# Patient Record
Sex: MALE | Employment: OTHER | URBAN - METROPOLITAN AREA
[De-identification: names, ages, dates, MRNs, and addresses within clinical notes are randomized per-mention and may not be internally consistent; named-entity substitution may affect disease eponyms.]

---

## 2017-01-18 ENCOUNTER — OFFICE VISIT (OUTPATIENT)
Dept: FAMILY MEDICINE CLINIC | Age: 72
End: 2017-01-18

## 2017-01-18 VITALS
WEIGHT: 234.25 LBS | DIASTOLIC BLOOD PRESSURE: 76 MMHG | TEMPERATURE: 97.4 F | OXYGEN SATURATION: 97 % | HEIGHT: 66 IN | HEART RATE: 65 BPM | SYSTOLIC BLOOD PRESSURE: 122 MMHG | RESPIRATION RATE: 16 BRPM | BODY MASS INDEX: 37.65 KG/M2

## 2017-01-18 DIAGNOSIS — E78.00 HYPERCHOLESTEROLEMIA: ICD-10-CM

## 2017-01-18 DIAGNOSIS — E03.1 CONGENITAL HYPOTHYROIDISM WITHOUT GOITER: ICD-10-CM

## 2017-01-18 DIAGNOSIS — E55.9 VITAMIN D DEFICIENCY: ICD-10-CM

## 2017-01-18 DIAGNOSIS — I10 ESSENTIAL HYPERTENSION: ICD-10-CM

## 2017-01-18 DIAGNOSIS — E11.9 TYPE 2 DIABETES MELLITUS WITHOUT COMPLICATION, WITHOUT LONG-TERM CURRENT USE OF INSULIN (HCC): Primary | ICD-10-CM

## 2017-01-18 NOTE — MR AVS SNAPSHOT
Visit Information Date & Time Provider Department Dept. Phone Encounter #  
 1/18/2017  1:45 PM [place] Acuna, [address] [facility] [phone] [id number] Follow-up Instructions Return for well exam mid April with fasting labs. Upcoming Health Maintenance Date Due  
 EYE EXAM RETINAL OR DILATED Q1 4/6/2016 FOOT EXAM Q1 7/14/2016 MICROALBUMIN Q1 3/28/2017 MEDICARE YEARLY EXAM 3/29/2017 GLAUCOMA SCREENING Q2Y 4/6/2017 HEMOGLOBIN A1C Q6M 4/11/2017 LIPID PANEL Q1 10/11/2017 COLONOSCOPY 4/27/2021 DTaP/Tdap/Td series (2 - Td) 7/14/2025 Allergies as of 1/18/2017  Review Complete On: 1/18/2017 By: Jacobo Guy LPN No Known Allergies Current Immunizations  Reviewed on 10/4/2016 Name Date Influenza High Dose Vaccine PF 10/1/2016 Influenza Vaccine 11/3/2015, 10/18/2013 Influenza Vaccine Split 10/5/2012, 1/4/2012 Pneumococcal Conjugate (PCV-13) 3/11/2016 Pneumococcal Vaccine (Unspecified Type) 1/4/2012 Tdap 7/14/2015 Not reviewed this visit You Were Diagnosed With   
  
 Codes Comments Type 2 diabetes mellitus without complication, without long-term current use of insulin (HCC)    -  Primary ICD-10-CM: E11.9 ICD-9-CM: 250.00 Vitamin D deficiency     ICD-10-CM: E55.9 ICD-9-CM: 268.9 Congenital hypothyroidism without goiter     ICD-10-CM: E03.1 ICD-9-CM: 921 Essential hypertension     ICD-10-CM: I10 
ICD-9-CM: 401.9 Hypercholesterolemia     ICD-10-CM: E78.00 ICD-9-CM: 272.0 Vitals BP Pulse Temp Resp Height(growth percentile) Weight(growth percentile) 122/76 65 97.4 °F (36.3 °C) (Oral) 16 5' 6\" (1.676 m) 234 lb 4 oz (106.3 kg) SpO2 BMI Smoking Status 97% 37.81 kg/m2 Never Smoker Vitals History BMI and BSA Data Body Mass Index Body Surface Area  
 37.81 kg/m 2 2.22 m 2 Preferred Pharmacy Pharmacy Name Phone [facility], [address] [place] [address] Your Updated Medication List  
  
   
This list is accurate as of: 1/18/17  1:55 PM.  Always use your most recent med list. amLODIPine 5 mg tablet Commonly known as:  Ned Dillardadrian Take 1 Tab by mouth daily. aspirin 325 mg tablet Commonly known as:  ASPIRIN Take 325 mg by mouth daily. atenolol 25 mg tablet Commonly known as:  TENORMIN  
TAKE THREE TABLETS BY MOUTH DAILY  
  
 atorvastatin 20 mg tablet Commonly known as:  LIPITOR  
TAKE ONE TABLET BY MOUTH ONCE DAILY FISH OIL 1,000 mg (120 mg-180 mg) Cap Generic drug:  Omega-3-DHA-EPA-Fish Oil Take  by mouth.  
  
 glimepiride 2 mg tablet Commonly known as:  AMARYL  
TAKE ONE TABLET BY MOUTH IN THE MORNING  
  
 glucose blood VI test strips strip Commonly known as:  ASCENSIA AUTODISC VI, ONE TOUCH ULTRA TEST VI  
by Does Not Apply route. Bid monitoring Dx: 250.00  
  
 hydroCHLOROthiazide 25 mg tablet Commonly known as:  HYDRODIURIL  
TAKE ONE TABLET BY MOUTH ONCE DAILY  
  
 levothyroxine 25 mcg tablet Commonly known as:  SYNTHROID Take 1 Tab by mouth Daily (before breakfast). lisinopril 40 mg tablet Commonly known as:  PRINIVIL, ZESTRIL  
TAKE ONE TABLET BY MOUTH DAILY losartan 100 mg tablet Commonly known as:  COZAAR  
TAKE ONE TABLET BY MOUTH DAILY  
  
 multivitamin tablet Commonly known as:  ONE A DAY Take 1 Tab by mouth daily. nicotinic acid 500 mg tablet Commonly known as:  NIACIN Take 500 mg by mouth Daily (before breakfast). SITagliptin-metFORMIN  mg per tablet Commonly known as:  JANUMET  
TAKE ONE TABLET BY MOUTH TWICE DAILY WITH MEALS  
  
 timolol 0.5 % ophthalmic solution Commonly known as:  TIMOPTIC We Performed the Following CBC WITH AUTOMATED DIFF [00913 CPT(R)] HEMOGLOBIN A1C WITH EAG [16525 CPT(R)] LIPID PANEL [50168 CPT(R)] METABOLIC PANEL, COMPREHENSIVE [54007 CPT(R)] MICROALBUMIN, UR, RAND W/ MICROALBUMIN/CREA RATIO [id number] CPT(R)] TSH 3RD GENERATION [83931 CPT(R)] VITAMIN D, 25 HYDROXY [id number] CPT(R)] Follow-up Instructions Return for well exam mid April with fasting labs. Introducing [facility]! Gerri Ansari introduces code-laboration patient portal. Now you can access parts of your medical record, email your doctor's office, and request medication refills online. 1. In your internet browser, go to [email or web address] 2. Click on the First Time User? Click Here link in the Sign In box. You will see the New Member Sign Up page. 3. Enter your code-[employer]aboration Access Code exactly as it appears below. You will not need to use this code after youve completed the sign-up process. If you do not sign up before the expiration date, you must request a new code. · code-laboration Access Code: [email or web address] Expires: 4/18/2017  1:54 PM 
 
4. Enter the last four digits of your Social Security Number (xxxx) and Date of Birth (mm/dd/yyyy) as indicated and click Submit. You will be taken to the next sign-up page. 5. Create a code-laboration ID. This will be your code-laboration login ID and cannot be changed, so think of one that is secure and easy to remember. 6. Create a code-laboration password. You can change your password at any time. 7. Enter your Password Reset Question and Answer. This can be used at a later time if you forget your password. 8. Enter your e-mail address. You will receive e-mail notification when new information is available in [address] 9. Click Sign Up. You can now view and download portions of your medical record. 10. Click the Download Summary menu link to download a portable copy of your medical information. If you have questions, please visit the Frequently Asked Questions section of the code-laboration website. Remember, code-laboration is NOT to be used for urgent needs. For medical emergencies, dial 911. Now available from your iPhone and Android! Please provide this summary of care documentation to your next provider. Your primary care clinician is listed as Carlos A Damico. If you have any questions after today's visit, please call [phone].

## 2017-01-18 NOTE — PROGRESS NOTES
1. Have you been to the ER, urgent care clinic since your last visit? Hospitalized since your last visit? No    2. Have you seen or consulted any other health care providers outside of the [address] [place] [address] since your last visit? Include any pap smears or colon screening.  No    Chief Complaint   Patient presents with    Follow-up     3 mo f/u, med ck

## 2017-01-19 LAB
25(OH)D3+25(OH)D2 SERPL-MCNC: 33.5 NG/ML (ref 30–100)
ALBUMIN SERPL-MCNC: 4.4 G/DL (ref 3.5–4.8)
ALBUMIN/CREAT UR: 17.2 MG/G CREAT (ref 0–30)
ALBUMIN/GLOB SERPL: 1.8 {RATIO} (ref 1.1–2.5)
ALP SERPL-CCNC: 45 IU/L (ref 39–117)
ALT SERPL-CCNC: 22 IU/L (ref 0–44)
AST SERPL-CCNC: 20 IU/L (ref 0–40)
BASOPHILS # BLD AUTO: 0 X10E3/UL (ref 0–0.2)
BASOPHILS NFR BLD AUTO: 0 %
BILIRUB SERPL-MCNC: 0.4 MG/DL (ref 0–1.2)
BUN SERPL-MCNC: 17 MG/DL (ref 8–27)
BUN/CREAT SERPL: 14 (ref 10–22)
CALCIUM SERPL-MCNC: 10.1 MG/DL (ref 8.6–10.2)
CHLORIDE SERPL-SCNC: 100 MMOL/L (ref 96–106)
CHOLEST SERPL-MCNC: 132 MG/DL (ref 100–199)
CO2 SERPL-SCNC: 25 MMOL/L (ref 18–29)
CREAT SERPL-MCNC: 1.25 MG/DL (ref 0.76–1.27)
CREAT UR-MCNC: 110.3 MG/DL
EOSINOPHIL # BLD AUTO: 0.1 X10E3/UL (ref 0–0.4)
EOSINOPHIL NFR BLD AUTO: 1 %
ERYTHROCYTE [DISTWIDTH] IN BLOOD BY AUTOMATED COUNT: 15.5 % (ref 12.3–15.4)
EST. AVERAGE GLUCOSE BLD GHB EST-MCNC: 166 MG/DL
GLOBULIN SER CALC-MCNC: 2.5 G/DL (ref 1.5–4.5)
GLUCOSE SERPL-MCNC: 124 MG/DL (ref 65–99)
HBA1C MFR BLD: 7.4 % (ref 4.8–5.6)
HCT VFR BLD AUTO: 42.7 % (ref 37.5–51)
HDLC SERPL-MCNC: 23 MG/DL
HGB BLD-MCNC: 14.4 G/DL (ref 12.6–17.7)
IMM GRANULOCYTES # BLD: 0 X10E3/UL (ref 0–0.1)
IMM GRANULOCYTES NFR BLD: 0 %
INTERPRETATION, 910389: NORMAL
INTERPRETATION: NORMAL
LDLC SERPL CALC-MCNC: 78 MG/DL (ref 0–99)
LYMPHOCYTES # BLD AUTO: 1.3 X10E3/UL (ref 0.7–3.1)
LYMPHOCYTES NFR BLD AUTO: 30 %
MCH RBC QN AUTO: 28.9 PG (ref 26.6–33)
MCHC RBC AUTO-ENTMCNC: 33.7 G/DL (ref 31.5–35.7)
MCV RBC AUTO: 86 FL (ref 79–97)
MICROALBUMIN UR-MCNC: 19 UG/ML
MONOCYTES # BLD AUTO: 0.5 X10E3/UL (ref 0.1–0.9)
MONOCYTES NFR BLD AUTO: 11 %
NEUTROPHILS # BLD AUTO: 2.6 X10E3/UL (ref 1.4–7)
NEUTROPHILS NFR BLD AUTO: 58 %
PLATELET # BLD AUTO: 181 X10E3/UL (ref 150–379)
POTASSIUM SERPL-SCNC: 5 MMOL/L (ref 3.5–5.2)
PROT SERPL-MCNC: 6.9 G/DL (ref 6–8.5)
RBC # BLD AUTO: 4.99 X10E6/UL (ref 4.14–5.8)
SODIUM SERPL-SCNC: 141 MMOL/L (ref 134–144)
TRIGL SERPL-MCNC: 155 MG/DL (ref 0–149)
TSH SERPL DL<=0.005 MIU/L-ACNC: 0.01 UIU/ML (ref 0.45–4.5)
VLDLC SERPL CALC-MCNC: 31 MG/DL (ref 5–40)
WBC # BLD AUTO: 4.5 X10E3/UL (ref 3.4–10.8)

## 2017-01-23 NOTE — PROGRESS NOTES
Find out if he is still taking synthroid because he is still running too fast.  He needs to stop the med completely if he is still on it and recheck his level in 3 months. Otherwise his labs look great for sugar and cholesterol.  Michael [place]

## 2017-01-26 NOTE — PROGRESS NOTES
HISTORY OF PRESENT ILLNESS  Abbie Sears is a 66 y.o. male. HPI  Cardiovascular Review:  The patient has hypertension and hyperlipidemia. Diet and Lifestyle: generally follows a low fat low cholesterol diet, generally follows a low sodium diet, exercises sporadically, nonsmoker  Home BP Monitoring: is not measured at home. Pertinent ROS: taking medications as instructed, no medication side effects noted, no TIA's, no chest pain on exertion, no dyspnea on exertion, no swelling of ankles. Diabetes Mellitus:  He has diabetes mellitus, and  hypertension and hyperlipidemia. Diabetic ROS - medication compliance: compliant all of the time, diabetic diet compliance: compliant most of the time, home glucose monitoring: fasting values range 120 or less. Lab review: orders written for new lab studies as appropriate; see orders. Thyroid Review:  Patient is seen for followup of hypothyroidism. Thyroid ROS: denies fatigue, weight changes, heat/cold intolerance, bowel/skin changes or CVS symptoms.   Vit D def:  Is not currently on meds, but low in the past, having some fatigue, no body aches    Patient Active Problem List    Diagnosis Date Noted    Vitamin D deficiency 07/14/2015    Hypothyroid 07/18/2014    [facility], [facility] 93/19/0861    Proteinuria 03/29/2011    Glaucoma 03/29/2011    CAD (coronary artery disease) 03/29/2011    HTN (hypertension) 03/29/2011    Hypertriglyceridemia 03/29/2011    Hypercholesterolemia 03/29/2011     Current Outpatient Prescriptions   Medication Sig Dispense Refill    sitaGLIPtin-metFORMIN (JANUMET)  mg per tablet TAKE ONE TABLET BY MOUTH TWICE DAILY WITH MEALS 180 Tab 3    lisinopril (PRINIVIL, ZESTRIL) 40 mg tablet TAKE ONE TABLET BY MOUTH DAILY 90 Tab 3    atorvastatin (LIPITOR) 20 mg tablet TAKE ONE TABLET BY MOUTH ONCE DAILY 90 Tab 3    hydrochlorothiazide (HYDRODIURIL) 25 mg tablet TAKE ONE TABLET BY MOUTH ONCE DAILY 90 Tab 3    losartan (COZAAR) 100 mg tablet TAKE ONE TABLET BY MOUTH DAILY 90 Tab 3    atenolol (TENORMIN) 25 mg tablet TAKE THREE TABLETS BY MOUTH DAILY 270 Tab 3    glimepiride (AMARYL) 2 mg tablet TAKE ONE TABLET BY MOUTH IN THE MORNING 90 Tab 3    amLODIPine (NORVASC) 5 mg tablet Take 1 Tab by mouth daily. 90 Tab 3    nicotinic acid (NIACIN) 500 mg tablet Take 500 mg by mouth Daily (before breakfast).  glucose blood VI test strips (ASCENSIA AUTODISC VI, ONE TOUCH ULTRA TEST VI) strip by Does Not Apply route. Bid monitoring  Dx: 250.00 300 Package 3    multivitamin (ONE A DAY) tablet Take 1 Tab by mouth daily.  timolol (TIMOPTIC) 0.5 % ophthalmic solution       Omega-3-DHA-EPA-Fish Oil (FISH OIL) 1,000 (120-180) mg Cap Take  by mouth.  aspirin (ASPIRIN) 325 mg tablet Take 325 mg by mouth daily.  levothyroxine (SYNTHROID) 25 mcg tablet Take 1 Tab by mouth Daily (before breakfast). 80 Tab 3     Family History   Problem Relation Age of Onset    Cancer Mother     Heart Disease Father     Cancer Sister     Alcohol abuse Brother      Social History   Substance Use Topics    Smoking status: Never Smoker    Smokeless tobacco: Never Used    Alcohol use No           ROS  A comprehensive review of system was obtained and negative except findings in the HPI    Visit Vitals    /76    Pulse 65    Temp 97.4 °F (36.3 °C) (Oral)    Resp 16    Ht 5' 6\" (1.676 m)    Wt 234 lb 4 oz (106.3 kg)    SpO2 97%    BMI 37.81 kg/m2     Physical Exam   Constitutional: He is oriented to person, place, and time. He appears well-developed and well-nourished. No distress. Cardiovascular: Normal rate and regular rhythm. No murmur heard. Pulmonary/Chest: Breath sounds normal. No respiratory distress. He has no wheezes. Musculoskeletal: He exhibits no edema. Neurological: He is alert and oriented to person, place, and time. Nursing note and vitals reviewed.       ASSESSMENT and PLAN  Encounter Diagnoses Name Primary?  Type 2 diabetes mellitus without complication, without long-term current use of insulin (HCC) Yes    Vitamin D deficiency     Congenital hypothyroidism without goiter     Essential hypertension     Hypercholesterolemia      Orders Placed This Encounter    CBC WITH AUTOMATED DIFF    LIPID PANEL    METABOLIC PANEL, COMPREHENSIVE    TSH 3RD GENERATION    HEMOGLOBIN A1C WITH EAG    VITAMIN D, 25 HYDROXY    MICROALBUMIN, UR, RAND W/ MICROALBUMIN/CREA RATIO    CVD REPORT    CKD REPORT     All labs updated today  Dev plan with results  Recheck 3 mo fasting  I have discussed the diagnosis with the patient and the intended plan as seen in the above orders. The patient has received an after-visit summary and questions were answered concerning future plans. Patient conveyed understanding of the plan at the time of the visit.     Sulma Bruno, MSN, ANP  1/26/2017

## 2017-04-18 ENCOUNTER — OFFICE VISIT (OUTPATIENT)
Dept: FAMILY MEDICINE CLINIC | Age: 72
End: 2017-04-18

## 2017-04-18 VITALS
HEART RATE: 60 BPM | BODY MASS INDEX: 36.84 KG/M2 | HEIGHT: 66 IN | TEMPERATURE: 97.8 F | OXYGEN SATURATION: 99 % | SYSTOLIC BLOOD PRESSURE: 108 MMHG | WEIGHT: 229.25 LBS | DIASTOLIC BLOOD PRESSURE: 74 MMHG

## 2017-04-18 DIAGNOSIS — E78.00 HYPERCHOLESTEROLEMIA: ICD-10-CM

## 2017-04-18 DIAGNOSIS — E03.1 CONGENITAL HYPOTHYROIDISM WITHOUT GOITER: ICD-10-CM

## 2017-04-18 DIAGNOSIS — I10 ESSENTIAL HYPERTENSION: ICD-10-CM

## 2017-04-18 DIAGNOSIS — E55.9 VITAMIN D DEFICIENCY: ICD-10-CM

## 2017-04-18 DIAGNOSIS — Z00.00 WELL ADULT EXAM: Primary | ICD-10-CM

## 2017-04-18 DIAGNOSIS — E11.9 TYPE 2 DIABETES MELLITUS WITHOUT COMPLICATION, WITHOUT LONG-TERM CURRENT USE OF INSULIN (HCC): ICD-10-CM

## 2017-04-18 RX ORDER — ATORVASTATIN CALCIUM 20 MG/1
TABLET, FILM COATED ORAL
Qty: 90 TAB | Refills: 3 | Status: SHIPPED | OUTPATIENT
Start: 2017-04-18

## 2017-04-18 RX ORDER — AMLODIPINE BESYLATE 5 MG/1
5 TABLET ORAL DAILY
Qty: 90 TAB | Refills: 3 | Status: SHIPPED | OUTPATIENT
Start: 2017-04-18

## 2017-04-18 RX ORDER — LISINOPRIL 40 MG/1
TABLET ORAL
Qty: 90 TAB | Refills: 3 | Status: SHIPPED | OUTPATIENT
Start: 2017-04-18

## 2017-04-18 RX ORDER — GLIMEPIRIDE 2 MG/1
TABLET ORAL
Qty: 90 TAB | Refills: 3 | Status: SHIPPED | OUTPATIENT
Start: 2017-04-18

## 2017-04-18 RX ORDER — HYDROCHLOROTHIAZIDE 25 MG/1
TABLET ORAL
Qty: 90 TAB | Refills: 3 | Status: SHIPPED | OUTPATIENT
Start: 2017-04-18

## 2017-04-18 RX ORDER — LOSARTAN POTASSIUM 100 MG/1
TABLET ORAL
Qty: 90 TAB | Refills: 3 | Status: SHIPPED | OUTPATIENT
Start: 2017-04-18

## 2017-04-18 RX ORDER — ATENOLOL 25 MG/1
TABLET ORAL
Qty: 270 TAB | Refills: 3 | Status: SHIPPED | OUTPATIENT
Start: 2017-04-18

## 2017-04-18 NOTE — PROGRESS NOTES
This is a Subsequent Medicare Annual Wellness Visit providing Personalized Prevention Plan Services (PPPS) (Performed 12 months after initial AWV and PPPS )  I have reviewed the patient's medical history in detail and updated the computerized patient record. History     Past Medical History:   Diagnosis Date    CAD (coronary artery disease)     Diabetes ([address]     Hypercholesterolemia     Hypertension       Past Surgical History:   Procedure Laterality Date    HX CORONARY STENT PLACEMENT  2008     Current Outpatient Prescriptions   Medication Sig Dispense Refill    SITagliptin-metFORMIN (JANUMET)  mg per tablet TAKE ONE TABLET BY MOUTH TWICE DAILY WITH MEALS 180 Tab 3    lisinopril (PRINIVIL, ZESTRIL) 40 mg tablet TAKE ONE TABLET BY MOUTH DAILY 90 Tab 3    atorvastatin (LIPITOR) 20 mg tablet TAKE ONE TABLET BY MOUTH ONCE DAILY 90 Tab 3    hydroCHLOROthiazide (HYDRODIURIL) 25 mg tablet TAKE ONE TABLET BY MOUTH ONCE DAILY 90 Tab 3    losartan (COZAAR) 100 mg tablet TAKE ONE TABLET BY MOUTH DAILY 90 Tab 3    atenolol (TENORMIN) 25 mg tablet TAKE THREE TABLETS BY MOUTH DAILY 270 Tab 3    glimepiride (AMARYL) 2 mg tablet TAKE ONE TABLET BY MOUTH IN THE MORNING 90 Tab 3    amLODIPine (NORVASC) 5 mg tablet Take 1 Tab by mouth daily. 90 Tab 3    nicotinic acid (NIACIN) 500 mg tablet Take 500 mg by mouth Daily (before breakfast).  glucose blood VI test strips (ASCENSIA AUTODISC VI, ONE TOUCH ULTRA TEST VI) strip by Does Not Apply route. Bid monitoring  Dx: 250.00 300 Package 3    multivitamin (ONE A DAY) tablet Take 1 Tab by mouth daily.  timolol (TIMOPTIC) 0.5 % ophthalmic solution       Omega-3-DHA-EPA-Fish Oil (FISH OIL) 1,000 (120-180) mg Cap Take  by mouth.  aspirin (ASPIRIN) 325 mg tablet Take 325 mg by mouth daily.        No Known Allergies  Family History   Problem Relation Age of Onset    Cancer Mother     Heart Disease Father     Cancer Sister     Alcohol abuse Brother Social History   Substance Use Topics    Smoking status: Never Smoker    Smokeless tobacco: Never Used    Alcohol use No     Patient Active Problem List   Diagnosis Code    Proteinuria R80.9    Glaucoma H40.9    CAD (coronary artery disease) I25.10    HTN (hypertension) I10    Hypertriglyceridemia E78.1    Hypercholesterolemia E78.00    Diabetes mellitus type 2, uncomplicated ([facility] W40.8    Hypothyroid E03.9    Vitamin D deficiency E55.9       Depression Risk Factor Screening:     PHQ 2 / 9, over the last two weeks 4/18/2017   Little interest or pleasure in doing things Not at all   Feeling down, depressed or hopeless Not at all   Total Score PHQ 2 0     Alcohol Risk Factor Screening: On any occasion during the past 3 months, have you had more than 4 drinks containing alcohol? No    Do you average more than 14 drinks per week? No      Functional Ability and Level of Safety:     Hearing Loss   mild    Activities of Daily Living   Self-care. Requires assistance with: no ADLs    Fall Risk     Fall Risk Assessment, last 12 mths 4/18/2017   Able to walk? Yes   Fall in past 12 months? No   Fall with injury? -   Number of falls in past 12 months -   Fall Risk Score -     Abuse Screen   Patient is not abused    Review of Systems   A comprehensive review of systems was negative except for that written in the HPI.     Physical Examination     Evaluation of Cognitive Function:  Mood/affect:  happy  Appearance: age appropriate  Family member/caregiver input: none    Visit Vitals    /74    Pulse 60    Temp 97.8 °F (36.6 °C)    Ht 5' 6\" (1.676 m)    Wt 229 lb 4 oz (104 kg)    SpO2 99%    BMI 37 kg/m2     General appearance: alert, cooperative, no distress, appears stated age  Lungs: clear to auscultation bilaterally  Heart: regular rate and rhythm, S1, S2 normal, no murmur, click, rub or gallop  Extremities: extremities normal, atraumatic, no cyanosis or edema    Patient Care Team:  Janis Cali Suri Mann NP as PCP - General (Family Practice)    Advice/Referrals/Counseling   Education and counseling provided:  Are appropriate based on today's review and evaluation      Assessment/Plan     Encounter Diagnoses   Name Primary?  Well adult exam Yes    Type 2 diabetes mellitus without complication, without long-term current use of insulin (HCC)     Essential hypertension     Hypercholesterolemia     Congenital hypothyroidism without goiter     Vitamin D deficiency      Orders Placed This Encounter    CBC WITH AUTOMATED DIFF    HEMOGLOBIN A1C WITH EAG    LIPID PANEL    METABOLIC PANEL, BASIC    TSH 3RD GENERATION    VITAMIN D, 25 HYDROXY    MICROALBUMIN, UR, RAND W/ MICROALBUMIN/CREA RATIO    SITagliptin-metFORMIN (JANUMET)  mg per tablet    lisinopril (PRINIVIL, ZESTRIL) 40 mg tablet    atorvastatin (LIPITOR) 20 mg tablet    hydroCHLOROthiazide (HYDRODIURIL) 25 mg tablet    losartan (COZAAR) 100 mg tablet    atenolol (TENORMIN) 25 mg tablet    glimepiride (AMARYL) 2 mg tablet    amLODIPine (NORVASC) 5 mg tablet   . Labs and refills updated today  Follow up 3 mo pending results  I have discussed the diagnosis with the patient and the intended plan as seen in the above orders. The patient has received an after-visit summary and questions were answered concerning future plans. Patient conveyed understanding of the plan at the time of the visit.     Dorothy Bhandari, MSN, ANP  4/18/2017

## 2017-04-18 NOTE — PROGRESS NOTES
1. Have you been to the ER, urgent care clinic since your last visit? Hospitalized since your last visit? No    2. Have you seen or consulted any other health care providers outside of the [address] [place] [address] since your last visit? Include any pap smears or colon screening.  No    Chief Complaint   Patient presents with    Complete Physical     wellness   [facility]

## 2017-04-18 NOTE — MR AVS SNAPSHOT
Visit Information Date & Time Provider Department Dept. Phone Encounter #  
 4/18/2017  9:30 AM Lseter Orantes, [place] [address] [facility] [phone] [id number] Upcoming Health Maintenance Date Due  
 EYE EXAM RETINAL OR DILATED Q1 4/6/2016 MEDICARE YEARLY EXAM 3/29/2017 GLAUCOMA SCREENING Q2Y 4/6/2017 HEMOGLOBIN A1C Q6M 7/18/2017 MICROALBUMIN Q1 1/18/2018 LIPID PANEL Q1 1/18/2018 FOOT EXAM Q1 4/18/2018 COLONOSCOPY 4/27/2021 DTaP/Tdap/Td series (2 - Td) 7/14/2025 Allergies as of 4/18/2017  Review Complete On: 4/18/2017 By: Jayda Ta LPN No Known Allergies Current Immunizations  Reviewed on 10/4/2016 Name Date Influenza High Dose Vaccine PF 10/1/2016 Influenza Vaccine 11/3/2015, 10/18/2013 Influenza Vaccine Split 10/5/2012, 1/4/2012 Pneumococcal Conjugate (PCV-13) 3/11/2016 Pneumococcal Vaccine (Unspecified Type) 1/4/2012 Tdap 7/14/2015 Not reviewed this visit You Were Diagnosed With   
  
 Codes Comments Well adult exam    -  Primary ICD-10-CM: Z00.00 ICD-9-CM: V70.0 Type 2 diabetes mellitus without complication, without long-term current use of insulin (HCC)     ICD-10-CM: E11.9 ICD-9-CM: 250.00 Essential hypertension     ICD-10-CM: I10 
ICD-9-CM: 401.9 Hypercholesterolemia     ICD-10-CM: E78.00 ICD-9-CM: 272.0 Congenital hypothyroidism without goiter     ICD-10-CM: E03.1 ICD-9-CM: 114 Vitamin D deficiency     ICD-10-CM: E55.9 ICD-9-CM: 268.9 Vitals BP Pulse Temp Height(growth percentile) Weight(growth percentile) SpO2  
 108/74 60 97.8 °F (36.6 °C) 5' 6\" (1.676 m) 229 lb 4 oz (104 kg) 99% BMI Smoking Status 37 kg/m2 Never Smoker Vitals History BMI and BSA Data Body Mass Index Body Surface Area  
 37 kg/m 2 2.2 m 2 Preferred Pharmacy Pharmacy Name Phone  [facility] [address] [phone] Your Updated Medication List  
  
   
This list is accurate as of: 4/18/17 10:09 AM.  Always use your most recent med list. amLODIPine 5 mg tablet Commonly known as:  Mosetta Hark Take 1 Tab by mouth daily. aspirin 325 mg tablet Commonly known as:  ASPIRIN Take 325 mg by mouth daily. atenolol 25 mg tablet Commonly known as:  TENORMIN  
TAKE THREE TABLETS BY MOUTH DAILY  
  
 atorvastatin 20 mg tablet Commonly known as:  LIPITOR  
TAKE ONE TABLET BY MOUTH ONCE DAILY FISH OIL 1,000 mg (120 mg-180 mg) Cap Generic drug:  Omega-3-DHA-EPA-Fish Oil Take  by mouth.  
  
 glimepiride 2 mg tablet Commonly known as:  AMARYL  
TAKE ONE TABLET BY MOUTH IN THE MORNING  
  
 glucose blood VI test strips strip Commonly known as:  ASCENSIA AUTODISC VI, ONE TOUCH ULTRA TEST VI  
by Does Not Apply route. Bid monitoring Dx: 250.00  
  
 hydroCHLOROthiazide 25 mg tablet Commonly known as:  HYDRODIURIL  
TAKE ONE TABLET BY MOUTH ONCE DAILY  
  
 lisinopril 40 mg tablet Commonly known as:  PRINIVIL, ZESTRIL  
TAKE ONE TABLET BY MOUTH DAILY losartan 100 mg tablet Commonly known as:  COZAAR  
TAKE ONE TABLET BY MOUTH DAILY  
  
 multivitamin tablet Commonly known as:  ONE A DAY Take 1 Tab by mouth daily. nicotinic acid 500 mg tablet Commonly known as:  NIACIN Take 500 mg by mouth Daily (before breakfast). SITagliptin-metFORMIN  mg per tablet Commonly known as:  JANUMET  
TAKE ONE TABLET BY MOUTH TWICE DAILY WITH MEALS  
  
 timolol 0.5 % ophthalmic solution Commonly known as:  TIMOPTIC Prescriptions Sent to Pharmacy Refills SITagliptin-metFORMIN (JANUMET)  mg per tablet 3 Sig: TAKE ONE TABLET BY MOUTH TWICE DAILY WITH MEALS Class: Normal  
 [facility] [address] [place] [address] Ph #: [phone] lisinopril (PRINIVIL, ZESTRIL) 40 mg tablet 3 Sig: TAKE ONE TABLET BY MOUTH DAILY Class: Normal  
 Pharmacy: [address] Ph #: [phone]  
 atorvastatin (LIPITOR) 20 mg tablet 3 Sig: TAKE ONE TABLET BY MOUTH ONCE DAILY Class: Normal  
 Pharmacy: [facility] [address] [place] [address] Ph #: [phone]  
 hydroCHLOROthiazide (HYDRODIURIL) 25 mg tablet 3 Sig: TAKE ONE TABLET BY MOUTH ONCE DAILY Class: Normal  
 Pharmacy: [facility] [address] [place] [address] Ph #: [phone]  
 losartan (COZAAR) 100 mg tablet 3 Sig: TAKE ONE TABLET BY MOUTH DAILY Class: Normal  
 Pharmacy: [address] Ph #: [phone]  
 atenolol (TENORMIN) 25 mg tablet 3 Sig: TAKE THREE TABLETS BY MOUTH DAILY Class: Normal  
 Pharmacy: [address] Ph #: [phone]  
 glimepiride (AMARYL) 2 mg tablet 3 Sig: TAKE ONE TABLET BY MOUTH IN THE MORNING Class: Normal  
 Pharmacy: [address] Ph #: [phone]  
 amLODIPine (NORVASC) 5 mg tablet 3 Sig: Take 1 Tab by mouth daily. Class: Normal  
 [address] Ph #: [phone] Route: Oral  
  
We Performed the Following CBC WITH AUTOMATED DIFF [85549 CPT(R)] HEMOGLOBIN A1C WITH EAG [52117 CPT(R)] LIPID PANEL [52731 CPT(R)] METABOLIC PANEL, BASIC [32674 CPT(R)] MICROALBUMIN, UR, RAND W/ MICROALBUMIN/CREA RATIO [id number] CPT(R)] TSH 3RD GENERATION [33188 CPT(R)] VITAMIN D, 25 HYDROXY [id number] CPT(R)] Introducing [facility] & HEALTH SERVICES!    
 Ani Wiley introduces [facility] patient portal. Now you can access parts of your medical record, email your doctor's office, and request medication refills online. 1. In your internet browser, go to [email or web address] 2. Click on the First Time User? Click Here link in the Sign In box. You will see the New Member Sign Up page. 3. Enter your Sellywhere Access Code exactly as it appears below. You will not need to use this code after youve completed the sign-up process. If you do not sign up before the expiration date, you must request a new code. · Sellywhere Access Code: [email or web address] Expires: 4/18/2017  2:54 PM 
 
4. Enter the last four digits of your Social Security Number (xxxx) and Date of Birth (mm/dd/yyyy) as indicated and click Submit. You will be taken to the next sign-up page. 5. Create a Sellywhere ID. This will be your Sellywhere login ID and cannot be changed, so think of one that is secure and easy to remember. 6. Create a Sellywhere password. You can change your password at any time. 7. Enter your Password Reset Question and Answer. This can be used at a later time if you forget your password. 8. Enter your e-mail address. You will receive e-mail notification when new information is available in [address] 9. Click Sign Up. You can now view and download portions of your medical record. 10. Click the Download Summary menu link to download a portable copy of your medical information. If you have questions, please visit the Frequently Asked Questions section of the Sellywhere website. Remember, Sellywhere is NOT to be used for urgent needs. For medical emergencies, dial 911. Now available from your iPhone and Android! Please provide this summary of care documentation to your next provider. Your primary care clinician is listed as Ita Henao. If you have any questions after today's visit, please call [phone].

## 2017-04-19 LAB
25(OH)D3+25(OH)D2 SERPL-MCNC: 36.6 NG/ML (ref 30–100)
ALBUMIN/CREAT UR: 29 MG/G CREAT (ref 0–30)
BASOPHILS # BLD AUTO: 0 X10E3/UL (ref 0–0.2)
BASOPHILS NFR BLD AUTO: 1 %
BUN SERPL-MCNC: 21 MG/DL (ref 8–27)
BUN/CREAT SERPL: 19 (ref 10–24)
CALCIUM SERPL-MCNC: 9.5 MG/DL (ref 8.6–10.2)
CHLORIDE SERPL-SCNC: 102 MMOL/L (ref 96–106)
CHOLEST SERPL-MCNC: 136 MG/DL (ref 100–199)
CO2 SERPL-SCNC: 21 MMOL/L (ref 18–29)
CREAT SERPL-MCNC: 1.08 MG/DL (ref 0.76–1.27)
CREAT UR-MCNC: 96.7 MG/DL
EOSINOPHIL # BLD AUTO: 0 X10E3/UL (ref 0–0.4)
EOSINOPHIL NFR BLD AUTO: 1 %
ERYTHROCYTE [DISTWIDTH] IN BLOOD BY AUTOMATED COUNT: 15.5 % (ref 12.3–15.4)
EST. AVERAGE GLUCOSE BLD GHB EST-MCNC: 154 MG/DL
GLUCOSE SERPL-MCNC: 141 MG/DL (ref 65–99)
HBA1C MFR BLD: 7 % (ref 4.8–5.6)
HCT VFR BLD AUTO: 43.3 % (ref 37.5–51)
HDLC SERPL-MCNC: 27 MG/DL
HGB BLD-MCNC: 14.2 G/DL (ref 12.6–17.7)
IMM GRANULOCYTES # BLD: 0 X10E3/UL (ref 0–0.1)
IMM GRANULOCYTES NFR BLD: 0 %
INTERPRETATION, 910389: NORMAL
LDLC SERPL CALC-MCNC: 87 MG/DL (ref 0–99)
LYMPHOCYTES # BLD AUTO: 1.1 X10E3/UL (ref 0.7–3.1)
LYMPHOCYTES NFR BLD AUTO: 30 %
Lab: NORMAL
MCH RBC QN AUTO: 28.5 PG (ref 26.6–33)
MCHC RBC AUTO-ENTMCNC: 32.8 G/DL (ref 31.5–35.7)
MCV RBC AUTO: 87 FL (ref 79–97)
MICROALBUMIN UR-MCNC: 28 UG/ML
MONOCYTES # BLD AUTO: 0.4 X10E3/UL (ref 0.1–0.9)
MONOCYTES NFR BLD AUTO: 10 %
NEUTROPHILS # BLD AUTO: 2.2 X10E3/UL (ref 1.4–7)
NEUTROPHILS NFR BLD AUTO: 58 %
PLATELET # BLD AUTO: 193 X10E3/UL (ref 150–379)
POTASSIUM SERPL-SCNC: 4.8 MMOL/L (ref 3.5–5.2)
RBC # BLD AUTO: 4.98 X10E6/UL (ref 4.14–5.8)
SODIUM SERPL-SCNC: 141 MMOL/L (ref 134–144)
TRIGL SERPL-MCNC: 108 MG/DL (ref 0–149)
TSH SERPL DL<=0.005 MIU/L-ACNC: 0.01 UIU/ML (ref 0.45–4.5)
VLDLC SERPL CALC-MCNC: 22 MG/DL (ref 5–40)
WBC # BLD AUTO: 3.7 X10E3/UL (ref 3.4–10.8)

## 2017-04-24 NOTE — PROGRESS NOTES
RECOMMENDATIONS:  None. Keep up the good work! Work on diet and exercise. Recheck this test: 3 months.

## 2017-11-28 ENCOUNTER — OFFICE VISIT (OUTPATIENT)
Dept: FAMILY MEDICINE CLINIC | Age: 72
End: 2017-11-28

## 2017-11-28 VITALS
HEIGHT: 66 IN | WEIGHT: 218 LBS | RESPIRATION RATE: 16 BRPM | OXYGEN SATURATION: 98 % | HEART RATE: 61 BPM | SYSTOLIC BLOOD PRESSURE: 132 MMHG | BODY MASS INDEX: 35.03 KG/M2 | DIASTOLIC BLOOD PRESSURE: 80 MMHG | TEMPERATURE: 97.8 F

## 2017-11-28 DIAGNOSIS — Z23 ENCOUNTER FOR IMMUNIZATION: ICD-10-CM

## 2017-11-28 DIAGNOSIS — E03.1 CONGENITAL HYPOTHYROIDISM WITHOUT GOITER: ICD-10-CM

## 2017-11-28 DIAGNOSIS — E78.00 HYPERCHOLESTEROLEMIA: ICD-10-CM

## 2017-11-28 DIAGNOSIS — I10 ESSENTIAL HYPERTENSION: ICD-10-CM

## 2017-11-28 DIAGNOSIS — E11.9 TYPE 2 DIABETES MELLITUS WITHOUT COMPLICATION, WITHOUT LONG-TERM CURRENT USE OF INSULIN (HCC): Primary | ICD-10-CM

## 2017-11-28 DIAGNOSIS — E78.1 HYPERTRIGLYCERIDEMIA: ICD-10-CM

## 2017-11-28 DIAGNOSIS — E55.9 VITAMIN D DEFICIENCY: ICD-10-CM

## 2017-11-28 RX ORDER — ERGOCALCIFEROL 1.25 MG/1
CAPSULE ORAL
COMMUNITY
Start: 2017-11-08

## 2017-11-28 NOTE — MR AVS SNAPSHOT
Visit Information Date & Time Provider Department Dept. Phone Encounter #  
 11/28/2017  1:45 PM Celine Christianson, [address] [facility] [phone] [id number] Follow-up Instructions Return for well exam with fasting labs after 4/18/18. Upcoming Health Maintenance Date Due  
 EYE EXAM RETINAL OR DILATED Q1 4/6/2016 GLAUCOMA SCREENING Q2Y 4/6/2017 Influenza Age 5 to Adult 8/1/2017 HEMOGLOBIN A1C Q6M 10/18/2017 FOOT EXAM Q1 4/18/2018 MICROALBUMIN Q1 4/18/2018 LIPID PANEL Q1 4/18/2018 MEDICARE YEARLY EXAM 4/19/2018 COLONOSCOPY 4/27/2021 DTaP/Tdap/Td series (2 - Td) 7/14/2025 Allergies as of 11/28/2017  Review Complete On: 11/28/2017 By: Celine Christianson NP No Known Allergies Current Immunizations  Reviewed on 10/4/2016 Name Date Influenza High Dose Vaccine PF 10/1/2016 Influenza Vaccine 11/3/2015, 10/18/2013 Influenza Vaccine Split 10/5/2012, 1/4/2012 Pneumococcal Conjugate (PCV-13) 3/11/2016 Tdap 7/14/2015 ZZZ-RETIRED (DO NOT USE) Pneumococcal Vaccine (Unspecified Type) 1/4/2012 Not reviewed this visit You Were Diagnosed With   
  
 Codes Comments Type 2 diabetes mellitus without complication, without long-term current use of insulin (HCC)    -  Primary ICD-10-CM: E11.9 ICD-9-CM: 250.00 Essential hypertension     ICD-10-CM: I10 
ICD-9-CM: 401.9 Hypertriglyceridemia     ICD-10-CM: E78.1 ICD-9-CM: 272.1 Hypercholesterolemia     ICD-10-CM: E78.00 ICD-9-CM: 272.0 Congenital hypothyroidism without goiter     ICD-10-CM: E03.1 ICD-9-CM: 612 Vitamin D deficiency     ICD-10-CM: E55.9 ICD-9-CM: 268.9 Vitals BP Pulse Temp Resp Height(growth percentile) Weight(growth percentile) 132/80 61 97.8 °F (36.6 °C) (Oral) 16 5' 6\" (1.676 m) 218 lb (98.9 kg) SpO2 BMI Smoking Status 98% 35.19 kg/m2 Never Smoker Vitals History BMI and BSA Data Body Mass Index Body Surface Area  
 35.19 kg/m 2 2.15 m 2 Preferred Pharmacy Pharmacy Name Phone [facility], [address] [place] [address] Your Updated Medication List  
  
   
This list is accurate as of: 11/28/17  2:25 PM.  Always use your most recent med list. amLODIPine 5 mg tablet Commonly known as:  Wynn Gio Take 1 Tab by mouth daily. aspirin 325 mg tablet Commonly known as:  ASPIRIN Take 325 mg by mouth daily. atenolol 25 mg tablet Commonly known as:  TENORMIN  
TAKE THREE TABLETS BY MOUTH DAILY  
  
 atorvastatin 20 mg tablet Commonly known as:  LIPITOR  
TAKE ONE TABLET BY MOUTH ONCE DAILY FISH OIL 1,000 mg (120 mg-180 mg) Cap Generic drug:  Omega-3-DHA-EPA-Fish Oil Take  by mouth.  
  
 glimepiride 2 mg tablet Commonly known as:  AMARYL  
TAKE ONE TABLET BY MOUTH IN THE MORNING  
  
 glucose blood VI test strips strip Commonly known as:  ASCENSIA AUTODISC VI, ONE TOUCH ULTRA TEST VI  
by Does Not Apply route. Bid monitoring Dx: 250.00  
  
 hydroCHLOROthiazide 25 mg tablet Commonly known as:  HYDRODIURIL  
TAKE ONE TABLET BY MOUTH ONCE DAILY  
  
 lisinopril 40 mg tablet Commonly known as:  PRINIVIL, ZESTRIL  
TAKE ONE TABLET BY MOUTH DAILY losartan 100 mg tablet Commonly known as:  COZAAR  
TAKE ONE TABLET BY MOUTH DAILY  
  
 multivitamin tablet Commonly known as:  ONE A DAY Take 1 Tab by mouth daily. nicotinic acid 500 mg tablet Commonly known as:  NIACIN Take 500 mg by mouth Daily (before breakfast). SITagliptin-metFORMIN  mg per tablet Commonly known as:  JANUMET  
TAKE ONE TABLET BY MOUTH TWICE DAILY WITH MEALS  
  
 timolol 0.5 % ophthalmic solution Commonly known as:  TIMOPTIC  
  
 VITAMIN D2 50,000 unit capsule Generic drug:  ergocalciferol 1 capsule per MONTH We Performed the Following CBC WITH AUTOMATED DIFF [42802 CPT(R)] HEMOGLOBIN A1C WITH EAG [46996 CPT(R)] LIPID PANEL [11337 CPT(R)] METABOLIC PANEL, COMPREHENSIVE [34766 CPT(R)] MICROALBUMIN, UR, RAND W/ MICROALBUMIN/CREA RATIO [id number] CPT(R)] TSH 3RD GENERATION [77591 CPT(R)] VITAMIN D, 25 HYDROXY [id number] CPT(R)] Follow-up Instructions Return for well exam with fasting labs after 4/18/18. Introducing [facility] & HEALTH SERVICES! John Sen introduces [employer] patient portal. Now you can access parts of your medical record, email your doctor's office, and request medication refills online. 1. In your internet browser, go to [email or web address] 2. Click on the First Time User? Click Here link in the Sign In box. You will see the New Member Sign Up page. 3. Enter your [employer] Access Code exactly as it appears below. You will not need to use this code after youve completed the sign-up process. If you do not sign up before the expiration date, you must request a new code. · [employer] Access Code: [email or web address] Expires: 2/26/2018  2:22 PM 
 
4. Enter the last four digits of your Social Security Number (xxxx) and Date of Birth (mm/dd/yyyy) as indicated and click Submit. You will be taken to the next sign-up page. 5. Create a [employer] ID. This will be your [employer] login ID and cannot be changed, so think of one that is secure and easy to remember. 6. Create a [employer] password. You can change your password at any time. 7. Enter your Password Reset Question and Answer. This can be used at a later time if you forget your password. 8. Enter your e-mail address. You will receive e-mail notification when new information is available in [address] 9. Click Sign Up. You can now view and download portions of your medical record. 10. Click the Download Summary menu link to download a portable copy of your medical information. If you have questions, please visit the Frequently Asked Questions section of the [employer] website. Remember, [employer] is NOT to be used for urgent needs. For medical emergencies, dial 911. Now available from your iPhone and Android! Please provide this summary of care documentation to your next provider. Your primary care clinician is listed as Renu Warren. If you have any questions after today's visit, please call [phone].

## 2017-11-28 NOTE — PROGRESS NOTES
1. Have you been to the ER, urgent care clinic since your last visit? Hospitalized since your last visit? No    2. Have you seen or consulted any other health care providers outside of the [address] [place] [address] since your last visit? Include any pap smears or colon screening.  No    Chief Complaint   Patient presents with    Follow-up     7 mo f/u, DM    Labs     fasting

## 2017-11-28 NOTE — PROGRESS NOTES
HISTORY OF PRESENT ILLNESS  Digna Braswell is a 74 y.o. male. HPI  Cardiovascular Review:  The patient has hypertension and hyperlipidemia. Diet and Lifestyle: generally follows a low fat low cholesterol diet, generally follows a low sodium diet, follows a diabetic diet regularly, exercises sporadically, nonsmoker  Home BP Monitoring: is not measured at home. Pertinent ROS: taking medications as instructed, no medication side effects noted, no TIA's, no chest pain on exertion, no dyspnea on exertion, no swelling of ankles. Diabetes Mellitus:  He has diabetes mellitus, and  hypertension and hyperlipidemia. Diabetic ROS - medication compliance: compliant all of the time, diabetic diet compliance: compliant all of the time, home glucose monitoring: is not performed. Lab review: orders written for new lab studies as appropriate; see orders. Thyroid Review:  Patient is seen for followup of hypothyroidism. Thyroid ROS: denies fatigue, weight changes, heat/cold intolerance, bowel/skin changes or CVS symptoms.   Vit D def:  Dr. Tevin Ruth has decreased his dose to once a month        Patient Active Problem List    Diagnosis Date Noted    Vitamin D deficiency 07/14/2015    Hypothyroid 07/18/2014    Diabetes mellitus type 2, uncomplicated ([facility] 39/92/8266    Proteinuria 03/29/2011    Glaucoma 03/29/2011    CAD (coronary artery disease) 03/29/2011    HTN (hypertension) 03/29/2011    Hypertriglyceridemia 03/29/2011    Hypercholesterolemia 03/29/2011     Current Outpatient Prescriptions   Medication Sig Dispense Refill    VITAMIN D2 50,000 unit capsule 1 capsule per MONTH      SITagliptin-metFORMIN (JANUMET)  mg per tablet TAKE ONE TABLET BY MOUTH TWICE DAILY WITH MEALS 180 Tab 3    lisinopril (PRINIVIL, ZESTRIL) 40 mg tablet TAKE ONE TABLET BY MOUTH DAILY 90 Tab 3    atorvastatin (LIPITOR) 20 mg tablet TAKE ONE TABLET BY MOUTH ONCE DAILY 90 Tab 3    hydroCHLOROthiazide (HYDRODIURIL) 25 mg tablet TAKE ONE TABLET BY MOUTH ONCE DAILY 90 Tab 3    losartan (COZAAR) 100 mg tablet TAKE ONE TABLET BY MOUTH DAILY 90 Tab 3    atenolol (TENORMIN) 25 mg tablet TAKE THREE TABLETS BY MOUTH DAILY 270 Tab 3    glimepiride (AMARYL) 2 mg tablet TAKE ONE TABLET BY MOUTH IN THE MORNING 90 Tab 3    amLODIPine (NORVASC) 5 mg tablet Take 1 Tab by mouth daily. 90 Tab 3    nicotinic acid (NIACIN) 500 mg tablet Take 500 mg by mouth Daily (before breakfast).  glucose blood VI test strips (ASCENSIA AUTODISC VI, ONE TOUCH ULTRA TEST VI) strip by Does Not Apply route. Bid monitoring  Dx: 250.00 300 Package 3    multivitamin (ONE A DAY) tablet Take 1 Tab by mouth daily.  timolol (TIMOPTIC) 0.5 % ophthalmic solution       Omega-3-DHA-EPA-Fish Oil (FISH OIL) 1,000 (120-180) mg Cap Take  by mouth.  aspirin (ASPIRIN) 325 mg tablet Take 325 mg by mouth daily. Family History   Problem Relation Age of Onset    Cancer Mother     Heart Disease Father     Cancer Sister     Alcohol abuse Brother      Social History   Substance Use Topics    Smoking status: Never Smoker    Smokeless tobacco: Never Used    Alcohol use No       ROS  A comprehensive review of system was obtained and negative except findings in the HPI    Visit Vitals    /80    Pulse 61    Temp 97.8 °F (36.6 °C) (Oral)    Resp 16    Ht 5' 6\" (1.676 m)    Wt 218 lb (98.9 kg)    SpO2 98%    BMI 35.19 kg/m2     Physical Exam   Constitutional: He is oriented to person, place, and time. He appears well-developed and well-nourished. No distress. Cardiovascular: Normal rate and regular rhythm. No murmur heard. Pulmonary/Chest: Breath sounds normal. No respiratory distress. He has no wheezes. Musculoskeletal: He exhibits no edema. Neurological: He is alert and oriented to person, place, and time. Nursing note and vitals reviewed. ASSESSMENT and PLAN  Encounter Diagnoses   Name Primary?     Type 2 diabetes mellitus without complication, without long-term current use of insulin (HCC) Yes    Essential hypertension     Hypertriglyceridemia     Hypercholesterolemia     Congenital hypothyroidism without goiter     Vitamin D deficiency      Orders Placed This Encounter    CBC WITH AUTOMATED DIFF    LIPID PANEL    METABOLIC PANEL, COMPREHENSIVE    VITAMIN D, 25 HYDROXY    HEMOGLOBIN A1C WITH EAG    MICROALBUMIN, UR, RAND W/ MICROALBUMIN/CREA RATIO    TSH 3RD GENERATION    VITAMIN D2 50,000 unit capsule     I have discussed the diagnosis with the patient and the intended plan as seen in the above orders. The patient has received an after-visit summary and questions were answered concerning future plans. Patient conveyed understanding of the plan at the time of the visit.     Mell Ireland, MSN, ANP  11/28/2017

## 2017-11-29 LAB
25(OH)D3+25(OH)D2 SERPL-MCNC: 50.9 NG/ML (ref 30–100)
ALBUMIN SERPL-MCNC: 4.6 G/DL (ref 3.5–4.8)
ALBUMIN/CREAT UR: 27.4 MG/G CREAT (ref 0–30)
ALBUMIN/GLOB SERPL: 1.7 {RATIO} (ref 1.2–2.2)
ALP SERPL-CCNC: 42 IU/L (ref 39–117)
ALT SERPL-CCNC: 26 IU/L (ref 0–44)
AST SERPL-CCNC: 27 IU/L (ref 0–40)
BASOPHILS # BLD AUTO: 0 X10E3/UL (ref 0–0.2)
BASOPHILS NFR BLD AUTO: 0 %
BILIRUB SERPL-MCNC: 0.6 MG/DL (ref 0–1.2)
BUN SERPL-MCNC: 15 MG/DL (ref 8–27)
BUN/CREAT SERPL: 14 (ref 10–24)
CALCIUM SERPL-MCNC: 9.9 MG/DL (ref 8.6–10.2)
CHLORIDE SERPL-SCNC: 99 MMOL/L (ref 96–106)
CHOLEST SERPL-MCNC: 161 MG/DL (ref 100–199)
CO2 SERPL-SCNC: 25 MMOL/L (ref 18–29)
CREAT SERPL-MCNC: 1.07 MG/DL (ref 0.76–1.27)
CREAT UR-MCNC: 85.7 MG/DL
EOSINOPHIL # BLD AUTO: 0 X10E3/UL (ref 0–0.4)
EOSINOPHIL NFR BLD AUTO: 1 %
ERYTHROCYTE [DISTWIDTH] IN BLOOD BY AUTOMATED COUNT: 16.1 % (ref 12.3–15.4)
EST. AVERAGE GLUCOSE BLD GHB EST-MCNC: 134 MG/DL
GFR SERPLBLD CREATININE-BSD FMLA CKD-EPI: 68 ML/MIN/1.73
GFR SERPLBLD CREATININE-BSD FMLA CKD-EPI: 79 ML/MIN/1.73
GLOBULIN SER CALC-MCNC: 2.7 G/DL (ref 1.5–4.5)
GLUCOSE SERPL-MCNC: 85 MG/DL (ref 65–99)
HBA1C MFR BLD: 6.3 % (ref 4.8–5.6)
HCT VFR BLD AUTO: 43.8 % (ref 37.5–51)
HDLC SERPL-MCNC: 32 MG/DL
HGB BLD-MCNC: 14.7 G/DL (ref 12.6–17.7)
IMM GRANULOCYTES # BLD: 0 X10E3/UL (ref 0–0.1)
IMM GRANULOCYTES NFR BLD: 0 %
INTERPRETATION, 910389: NORMAL
LDLC SERPL CALC-MCNC: 109 MG/DL (ref 0–99)
LYMPHOCYTES # BLD AUTO: 1.4 X10E3/UL (ref 0.7–3.1)
LYMPHOCYTES NFR BLD AUTO: 28 %
Lab: NORMAL
MCH RBC QN AUTO: 29.1 PG (ref 26.6–33)
MCHC RBC AUTO-ENTMCNC: 33.6 G/DL (ref 31.5–35.7)
MCV RBC AUTO: 87 FL (ref 79–97)
MICROALBUMIN UR-MCNC: 23.5 UG/ML
MONOCYTES # BLD AUTO: 0.5 X10E3/UL (ref 0.1–0.9)
MONOCYTES NFR BLD AUTO: 10 %
NEUTROPHILS # BLD AUTO: 3.1 X10E3/UL (ref 1.4–7)
NEUTROPHILS NFR BLD AUTO: 61 %
PLATELET # BLD AUTO: 219 X10E3/UL (ref 150–379)
POTASSIUM SERPL-SCNC: 4.5 MMOL/L (ref 3.5–5.2)
PROT SERPL-MCNC: 7.3 G/DL (ref 6–8.5)
RBC # BLD AUTO: 5.05 X10E6/UL (ref 4.14–5.8)
SODIUM SERPL-SCNC: 141 MMOL/L (ref 134–144)
TRIGL SERPL-MCNC: 101 MG/DL (ref 0–149)
TSH SERPL DL<=0.005 MIU/L-ACNC: 0.17 UIU/ML (ref 0.45–4.5)
VLDLC SERPL CALC-MCNC: 20 MG/DL (ref 5–40)
WBC # BLD AUTO: 5 X10E3/UL (ref 3.4–10.8)

## 2018-04-16 ENCOUNTER — TELEPHONE (OUTPATIENT)
Dept: FAMILY MEDICINE CLINIC | Age: 73
End: 2018-04-16

## 2018-04-16 NOTE — TELEPHONE ENCOUNTER
Late entry: @  1:39 pm Dr Robert Maria at [facility] ed calling to inquire if pt has a hx of kidney issues and pt's most recent labs due to his abnormal labs today. Writer informed the provider last labs drawn were in 11/28/2017 with all normal results stated the pt creatinine level is 5.9. The provider states pt is not symptomatic at this time. The provider advised writer pt will be admitted at this time for further evaluation.

## 2018-04-23 ENCOUNTER — TELEPHONE (OUTPATIENT)
Dept: FAMILY MEDICINE CLINIC | Age: 73
End: 2018-04-23

## 2018-10-18 ENCOUNTER — DOCUMENTATION ONLY (OUTPATIENT)
Dept: FAMILY MEDICINE CLINIC | Age: 73
End: 2018-10-18

## 2018-10-18 NOTE — PROGRESS NOTES
Spoke with patient after verifying identity with /ADDRESS. Patient was informed of need for current PCP name. Patient listed on [employer] A1c list. Patient has not been seen in practice for over a year. Patient stated he is being followed by Dr. Chasidy Colby at [facility]. Patient informed he needed to contact [facility] and inform of current PCP name.